# Patient Record
Sex: MALE | URBAN - METROPOLITAN AREA
[De-identification: names, ages, dates, MRNs, and addresses within clinical notes are randomized per-mention and may not be internally consistent; named-entity substitution may affect disease eponyms.]

---

## 2024-05-30 ENCOUNTER — OFFICE VISIT (OUTPATIENT)
Dept: OTOLARYNGOLOGY | Facility: CLINIC | Age: 34
End: 2024-05-30
Payer: COMMERCIAL

## 2024-05-30 VITALS
DIASTOLIC BLOOD PRESSURE: 87 MMHG | WEIGHT: 246 LBS | TEMPERATURE: 97.2 F | BODY MASS INDEX: 34.44 KG/M2 | HEIGHT: 71 IN | SYSTOLIC BLOOD PRESSURE: 133 MMHG | HEART RATE: 102 BPM

## 2024-05-30 DIAGNOSIS — K11.8 PAROTID GLAND PAIN: ICD-10-CM

## 2024-05-30 DIAGNOSIS — R60.9 PAROTID SWELLING: Primary | ICD-10-CM

## 2024-05-30 DIAGNOSIS — K11.23 CHRONIC PAROTITIS: ICD-10-CM

## 2024-05-30 PROCEDURE — 99214 OFFICE O/P EST MOD 30 MIN: CPT | Performed by: NURSE PRACTITIONER

## 2024-05-30 PROCEDURE — 99204 OFFICE O/P NEW MOD 45 MIN: CPT | Performed by: NURSE PRACTITIONER

## 2024-05-30 PROCEDURE — 1036F TOBACCO NON-USER: CPT | Performed by: NURSE PRACTITIONER

## 2024-05-30 RX ORDER — AMOXICILLIN AND CLAVULANATE POTASSIUM 875; 125 MG/1; MG/1
TABLET, FILM COATED ORAL
Qty: 14 TABLET | Refills: 0 | Status: SHIPPED | OUTPATIENT
Start: 2024-05-30

## 2024-05-30 SDOH — ECONOMIC STABILITY: FOOD INSECURITY: WITHIN THE PAST 12 MONTHS, THE FOOD YOU BOUGHT JUST DIDN'T LAST AND YOU DIDN'T HAVE MONEY TO GET MORE.: NEVER TRUE

## 2024-05-30 SDOH — ECONOMIC STABILITY: FOOD INSECURITY: WITHIN THE PAST 12 MONTHS, YOU WORRIED THAT YOUR FOOD WOULD RUN OUT BEFORE YOU GOT MONEY TO BUY MORE.: NEVER TRUE

## 2024-05-30 ASSESSMENT — LIFESTYLE VARIABLES
SKIP TO QUESTIONS 9-10: 1
HOW MANY STANDARD DRINKS CONTAINING ALCOHOL DO YOU HAVE ON A TYPICAL DAY: 1 OR 2
AUDIT-C TOTAL SCORE: 1
HOW OFTEN DO YOU HAVE A DRINK CONTAINING ALCOHOL: MONTHLY OR LESS
HOW OFTEN DO YOU HAVE SIX OR MORE DRINKS ON ONE OCCASION: NEVER

## 2024-05-30 ASSESSMENT — COLUMBIA-SUICIDE SEVERITY RATING SCALE - C-SSRS
2. HAVE YOU ACTUALLY HAD ANY THOUGHTS OF KILLING YOURSELF?: NO
6. HAVE YOU EVER DONE ANYTHING, STARTED TO DO ANYTHING, OR PREPARED TO DO ANYTHING TO END YOUR LIFE?: NO
1. IN THE PAST MONTH, HAVE YOU WISHED YOU WERE DEAD OR WISHED YOU COULD GO TO SLEEP AND NOT WAKE UP?: NO

## 2024-05-30 ASSESSMENT — ENCOUNTER SYMPTOMS
OCCASIONAL FEELINGS OF UNSTEADINESS: 0
LOSS OF SENSATION IN FEET: 0
DEPRESSION: 0

## 2024-05-30 ASSESSMENT — PAIN SCALES - GENERAL: PAINLEVEL: 4

## 2024-05-30 ASSESSMENT — PATIENT HEALTH QUESTIONNAIRE - PHQ9
1. LITTLE INTEREST OR PLEASURE IN DOING THINGS: NOT AT ALL
2. FEELING DOWN, DEPRESSED OR HOPELESS: NOT AT ALL
SUM OF ALL RESPONSES TO PHQ9 QUESTIONS 1 AND 2: 0

## 2024-05-30 NOTE — PROGRESS NOTES
Subjective   Patient ID: Donaldo Edwards is a 33 y.o. male who presents for Facial Swelling.    HPI  Patient here for the left side of his jaw. It swells up. It happened 6-7 months ago when he was living in [place]. ENT there told him to drink more water and eat sour candy. It keeps coming back. It happened on about 2 weeks ago and then on Sunday, but went back down. He drinks water and then puts pressure on. He feels something drain into his mouth, not bad tasting. He has never taken antibiotics for this.  Sometimes he gets or swallowing, correct his hearing on that side.  Non-smoker.   Today it is still slightly tender and swollen.    There is no problem list on file for this patient.    History reviewed. No pertinent surgical history.    Review of Systems    All other systems have been reviewed and are negative for complaints except for those mentioned in history of present illness, past medical history and problem list.    Objective   Physical Exam    Constitutional: No fever, chills, weight loss or weight gain  General appearance: Appears well, well-nourished, well groomed. No acute distress.    Communication: Normal communication    Psychiatric: Oriented to person, place and time. Normal mood and affect.    Neurologic: Cranial nerves II-XII grossly intact and symmetric bilaterally.    Head and Face:  Head: Atraumatic with no masses, lesions or scarring.  Face: Normal symmetry. No scars or deformities.  TMJ: Normal, no trismus.    Eyes: Conjunctiva not edematous or erythematous.     Right Ear: External inspection of ear with no deformity, scars, or masses. EAC is clear.  TM is intact with no sign of infection, effusion, or retraction.  No perforation seen.     Left Ear: External inspection of ear with no deformity, scars, or masses. EAC is clear.  TM is intact with no sign of infection, effusion, or retraction.  No perforation seen.     Nose: External inspection of nose: No nasal lesions, lacerations or scars.  Anterior rhinoscopy with limited visualization past the inferior turbinates. No tenderness on frontal or maxillary sinus palpation.    Oral Cavity/Mouth: Oral cavity and oropharynx mucosa moist and pink. No lesions or masses. Dentition normal. Tonsils appear normal. Uvula is midline. Tongue with no masses or lesions. Tongue with good mobility. The oropharynx is clear.    Neck: Normal appearing, symmetric, trachea midline.  At the left parotid gland there is some generalized swelling and tenderness.  No discrete mass noted upon palpation.  No erythema.    Cardiovascular: Examination of peripheral vascular system shows no clubbing or cyanosis.    Respiratory: No respiratory distress increased work of breathing. Inspection of the chest with symmetric chest expansion and normal respiratory effort.    Skin: No head and neck rashes.    Lymph nodes: No adenopathy.     Assessment/Plan   Diagnoses and all orders for this visit:  Parotid swelling and parotid pain secondary to chronic/recurrent parotitis  -     amoxicillin-pot clavulanate (Augmentin) 875-125 mg tablet; Take 1 tablet twice daily for the next 7 days  -     CT soft tissue neck w IV contrast; Future    Due to patient's persistent symptoms and exam findings today, I recommend completing a 7-day course of Augmentin.  Patient does have an allergy listed to penicillin; however, he has not taken it in years and does not recall having a reaction.  Possibly having a rash on his arm but nothing anaphylactic.  He would like to try Augmentin and I sent this to the pharmacy.  I advised him to stop the medication if he develops any rash and to proceed to the ED with any worsening symptoms.  We will obtain CT neck to further evaluate the cause of the recurrent parotitis.  I will follow-up with results once reviewed.    Patient agrees with the plan and all questions answered to his satisfaction.    TAMARA Sheikh-CNP 05/30/24 11:19 AM

## 2024-05-30 NOTE — PATIENT INSTRUCTIONS
- Complete 7-day course of Augmentin.  If you develop a rash, stop medication.  If you develop any anaphylactic type symptoms proceed to the ED.  -Call [phone] to schedule your CT scan.  I will follow-up once results are reviewed.    Welcome to [facility]'s clinic. We are here to assist you through your ENT care at [facility].  Judith is a Nurse Practitioner who specializes in General ENT. This means that she specializes in taking care of patients with usual ENT issues such as nasal congestion, allergy symptoms, sinusitis, hearing loss, ear infections, ear wax removal, hoarseness, sore throat, throat infections, reflux and some swallowing issues. She also sees patients regarding dizziness and vertigo.   Rebecca is Judith's  and she answers the office phone from 7:30am-4pm Mon-Fri. Call [phone]. She can help you with scheduling of appointments, and general questions and information. You may need to leave a message if she is helping another patient. In this case, someone from the team will call you back the same day if you leave your message before 3pm, or the next business morning.  Judith currently sees patients at [facility] on Mondays, Wednesdays and Thursdays.  She works closely with audiologists to solve issues with hearing. She is also in very close contact with her collaborative physicians. Dr. Barros, a surgeon who specializes in general ENT and rhinology. She also works closely with otologist (ear surgeon) Dr. Arredondo and head and neck surgery Dr. Santiago.   Others who may be included in your care are dieticians, social workers, allergists, gastroenterologists, neurologists, and physical therapists. Judith will provide these referrals as needed. Please let her know if you would like to request a specific referral.  Judith makes every effort to run on time for your appointments. Therefore, if you are more than 15 minutes late unrelated to a scan or  another appointment such as therapy or audiology, your appointment will need to be rescheduled for another day. We appreciate your understanding.   We look forward to working with you to meet your healthcare goals.

## 2024-06-20 DIAGNOSIS — K11.23 CHRONIC PAROTITIS: Primary | ICD-10-CM

## 2024-06-24 ENCOUNTER — APPOINTMENT (OUTPATIENT)
Dept: RADIOLOGY | Facility: HOSPITAL | Age: 34
End: 2024-06-24
Payer: COMMERCIAL

## 2024-06-26 ENCOUNTER — HOSPITAL ENCOUNTER (OUTPATIENT)
Dept: RADIOLOGY | Facility: HOSPITAL | Age: 34
End: 2024-06-26
Payer: COMMERCIAL

## 2024-07-01 ENCOUNTER — TELEPHONE (OUTPATIENT)
Dept: OTOLARYNGOLOGY | Facility: HOSPITAL | Age: 34
End: 2024-07-01
Payer: COMMERCIAL

## 2024-07-01 NOTE — TELEPHONE ENCOUNTER
----- Message from Rebecca Goddard sent at 7/1/2024 10:26 AM EDT -----  Regarding: RE: CT RESULTS  Just spoke with him. I advised to drop off disc to  between 7a-4p and that once you've received and reviewed it, you will give him a call. He'd like to keep the disc for his records I advised once uploaded to our system, disc can be mailed back to him.   ----- Message -----  From: BRITTNI Sheikh  Sent: 7/1/2024   8:20 AM EDT  To: Rebecca Goddard  Subject: RE: CT RESULTS                                   He can drop the disc off and then I can call him. The facility should also fax me a copy of the report. I will call him once I review it.   ----- Message -----  From: Rebecca Goddard  Sent: 6/28/2024   1:08 PM EDT  To: BRITTNI Sheikh; Rebecca Goddard  Subject: CT RESULTS                                       Hi there, patient completed scan yesterday and provided a disc. Should he drop it off for you to review then give him a call to discuss results or does he need to be seen in the office?

## 2024-07-02 ENCOUNTER — TELEPHONE (OUTPATIENT)
Dept: OTOLARYNGOLOGY | Facility: HOSPITAL | Age: 34
End: 2024-07-02

## 2024-07-02 NOTE — TELEPHONE ENCOUNTER
----- Message from BRITTNI Sheikh sent at 7/1/2024  5:01 PM EDT -----  Regarding: RE: CT RESULTS  I got it, thanks!  ----- Message -----  From: Rebecca Goddard  Sent: 7/1/2024  10:29 AM EDT  To: BRITTNI Sheikh; Rebecca Goddard  Subject: RE: CT RESULTS                                   Just spoke with him. I advised to drop off disc to  between 7a-4p and that once you've received and reviewed it, you will give him a call. He'd like to keep the disc for his records I advised once uploaded to our system, disc can be mailed back to him.   ----- Message -----  From: BRITTNI Sheikh  Sent: 7/1/2024   8:20 AM EDT  To: Rebecca Goddard  Subject: RE: CT RESULTS                                   He can drop the disc off and then I can call him. The facility should also fax me a copy of the report. I will call him once I review it.   ----- Message -----  From: Rebecca Goddard  Sent: 6/28/2024   1:08 PM EDT  To: BRITTNI Sheikh; Rebecca Goddard  Subject: CT RESULTS                                       Hi there, patient completed scan yesterday and provided a disc. Should he drop it off for you to review then give him a call to discuss results or does he need to be seen in the office?

## 2024-07-03 ENCOUNTER — HOSPITAL ENCOUNTER (OUTPATIENT)
Dept: RADIOLOGY | Facility: EXTERNAL LOCATION | Age: 34
Discharge: HOME | End: 2024-07-03

## 2024-07-08 ENCOUNTER — APPOINTMENT (OUTPATIENT)
Dept: OTOLARYNGOLOGY | Facility: CLINIC | Age: 34
End: 2024-07-08
Payer: COMMERCIAL

## 2024-07-22 ENCOUNTER — OFFICE VISIT (OUTPATIENT)
Dept: OTOLARYNGOLOGY | Facility: CLINIC | Age: 34
End: 2024-07-22
Payer: COMMERCIAL

## 2024-07-22 VITALS
TEMPERATURE: 98.5 F | HEIGHT: 71 IN | WEIGHT: 244.25 LBS | HEART RATE: 95 BPM | DIASTOLIC BLOOD PRESSURE: 77 MMHG | RESPIRATION RATE: 16 BRPM | SYSTOLIC BLOOD PRESSURE: 126 MMHG | BODY MASS INDEX: 34.19 KG/M2 | OXYGEN SATURATION: 97 %

## 2024-07-22 DIAGNOSIS — M26.69 OTHER SPECIFIED DISORDERS OF TEMPOROMANDIBULAR JOINT: ICD-10-CM

## 2024-07-22 PROCEDURE — 3008F BODY MASS INDEX DOCD: CPT | Performed by: OTOLARYNGOLOGY

## 2024-07-22 PROCEDURE — 1036F TOBACCO NON-USER: CPT | Performed by: OTOLARYNGOLOGY

## 2024-07-22 PROCEDURE — 99213 OFFICE O/P EST LOW 20 MIN: CPT | Performed by: OTOLARYNGOLOGY

## 2024-07-22 ASSESSMENT — ENCOUNTER SYMPTOMS
DEPRESSION: 0
OCCASIONAL FEELINGS OF UNSTEADINESS: 0
LOSS OF SENSATION IN FEET: 0

## 2024-07-22 ASSESSMENT — PAIN SCALES - GENERAL: PAINLEVEL: 0-NO PAIN

## 2024-07-22 NOTE — PROGRESS NOTES
ENT New Patient Visit    Chief complaint: left cheek swelling    History Of Present Illness  Donaldo Edwards is a 33 y.o. male who presents today for further evaluation of recurrent left cheek swelling.  He notes that this started last September and there is no known provoking cause or incident.  He will intermittently feel that his left cheek starts to get tight and then will sometimes occasionally swell up and feel very tight. This takes days to resolve and he notes swelling in and around the ear.  He notes a very minor episode occurring today.  His only happens on the left side and never the right side. Does have a history of bruxism. He notes seeing an ENT in [place] before moving here.  Conservative management was recommended for suspected parotitis and patient noted limited improvement.  He saw Judith prior to coming here and a CT was completed.  He denies any change in symptoms since seeing her in May.      Past Medical History  He has no past medical history on file.    Surgical History  He has no past surgical history on file.     Social History  He reports that he has never smoked. He has never used smokeless tobacco. He reports that he does not currently use alcohol. He reports that he does not use drugs.    Family History  No family history on file.     Allergies  Cefaclor, Penicillins, and Sulfa (sulfonamide antibiotics)    Review of Systems   All other systems reviewed and are negative.       Physical Exam:  CONSTITUTIONAL:  No acute distress  VOICE:  No hoarseness or other abnormality  RESPIRATION:  Breathing comfortably, no stridor  CV:  No clubbing/cyanosis/edema in hands  EYES:  EOM intact, sclera normal  NEURO:  Alert and oriented times 3, Cranial nerves II-XII grossly intact and symmetric bilaterally  HEAD AND FACE:  Symmetric facial features, no masses or lesions, sinuses non-tender to palpation. Tenderness focally over left TMJ area.  SALIVARY GLANDS:  Parotid and submandibular glands normal  "bilaterally. Duct with appropriate clear drainage bilaterally  EARS:  Normal external ears, external auditory canals, and TMs to otoscopy, normal hearing to whispered voice.  NOSE:  External nose midline, anterior rhinoscopy is normal with limited visualization to the anterior aspect of the interior turbinates, no bleeding or drainage, no lesions  ORAL CAVITY/OROPHARYNX/LIPS:  Normal mucous membranes, normal floor of mouth/tongue/OP, no masses or lesions  PHARYNGEAL WALLS:  No masses or lesions  NECK/LYMPH:  No LAD, no thyroid masses, trachea midline  SKIN:  Neck skin is without scar or injury  PSYCH:  Alert and oriented with appropriate mood and affect     Last Recorded Vitals  Blood pressure 126/77, pulse 95, temperature 36.9 °C (98.5 °F), temperature source Temporal, resp. rate 16, height 1.803 m (5' 11\"), weight 111 kg (244 lb 4 oz), SpO2 97%.    Imaging:  Outside CT neck w contrast reviewed without obvious lesion or mass in or around the parotid gland. No lymphadenopathy of the neck.     Result Date: 7/3/2024  Outside images for comparison or treatment purposes, not interpreted by  Radiologists.       Assessment and Plan  33 y.o. male generally healthy who presents today for evaluation of intermittent pain/swelling of left cheek area. CT unremarkable and exam today without any evidence of salivary dysfunction or duct obstruction. He does have focal tenderness over TMJ in context of history of bruxism/jaw pain. Given the history and physical exam, would favor TMJ as the cause of his symptoms though parotitis cannot be entirely excluded. Pictures reviewed without significant parotid swelling, the swelling seems to be felt moreso by the patient than objectively visualized.     -Recommend referral to oral surgery  -RTC as needed or if TMJ ruled out    Philip Boo MD    "

## 2024-08-12 ENCOUNTER — APPOINTMENT (OUTPATIENT)
Dept: OTOLARYNGOLOGY | Facility: CLINIC | Age: 34
End: 2024-08-12
Payer: COMMERCIAL